# Patient Record
Sex: FEMALE | Race: OTHER | NOT HISPANIC OR LATINO
[De-identification: names, ages, dates, MRNs, and addresses within clinical notes are randomized per-mention and may not be internally consistent; named-entity substitution may affect disease eponyms.]

---

## 2021-04-06 ENCOUNTER — NON-APPOINTMENT (OUTPATIENT)
Age: 71
End: 2021-04-06

## 2021-04-06 ENCOUNTER — APPOINTMENT (OUTPATIENT)
Dept: CARDIOLOGY | Facility: CLINIC | Age: 71
End: 2021-04-06
Payer: MEDICARE

## 2021-04-06 VITALS
WEIGHT: 144.31 LBS | HEART RATE: 81 BPM | BODY MASS INDEX: 26.9 KG/M2 | HEIGHT: 61.5 IN | TEMPERATURE: 98.6 F | OXYGEN SATURATION: 98 %

## 2021-04-06 VITALS — SYSTOLIC BLOOD PRESSURE: 151 MMHG | DIASTOLIC BLOOD PRESSURE: 82 MMHG

## 2021-04-06 PROCEDURE — 99204 OFFICE O/P NEW MOD 45 MIN: CPT

## 2021-04-06 PROCEDURE — 99072 ADDL SUPL MATRL&STAF TM PHE: CPT

## 2021-04-06 PROCEDURE — 93000 ELECTROCARDIOGRAM COMPLETE: CPT

## 2021-04-06 NOTE — REASON FOR VISIT
[Initial Evaluation] : an initial evaluation of [Chest Pain] : chest pain [Hypertension] : hypertension [Palpitations] : palpitations

## 2021-04-06 NOTE — REVIEW OF SYSTEMS
[see HPI] : see HPI [Palpitations] : palpitations [Fever] : no fever [Headache] : no headache [Recent Weight Gain (___ Lbs)] : no recent weight gain [Chills] : no chills [Feeling Fatigued] : not feeling fatigued [Recent Weight Loss (___ Lbs)] : no recent weight loss [Sore Throat] : no sore throat [Shortness Of Breath] : no shortness of breath [Dyspnea on exertion] : not dyspnea during exertion [Chest  Pressure] : no chest pressure [Chest Pain] : no chest pain [Lower Ext Edema] : no extremity edema [Cough] : no cough [Wheezing] : no wheezing [Nausea] : no nausea [Vomiting] : no vomiting [Dizziness] : no dizziness [Confusion] : no confusion was observed [Excessive Thirst] : no polydipsia [Easy Bleeding] : no tendency for easy bleeding [Easy Bruising] : no tendency for easy bruising

## 2021-04-06 NOTE — PHYSICAL EXAM
[General Appearance - Well Developed] : well developed [Normal Appearance] : normal appearance [General Appearance - Well Nourished] : well nourished [Heart Rate And Rhythm] : heart rate and rhythm were normal [Murmurs] : no murmurs present [Edema] : no peripheral edema present [FreeTextEntry1] : radial pulses 2+ b/l [] : no respiratory distress [Respiration, Rhythm And Depth] : normal respiratory rhythm and effort [Auscultation Breath Sounds / Voice Sounds] : lungs were clear to auscultation bilaterally [Bowel Sounds] : normal bowel sounds [Abdomen Soft] : soft [Abdomen Tenderness] : non-tender [Abnormal Walk] : normal gait [Skin Turgor] : normal skin turgor [Oriented To Time, Place, And Person] : oriented to person, place, and time [Impaired Insight] : insight and judgment were intact [Affect] : the affect was normal [Mood] : the mood was normal

## 2021-04-06 NOTE — DISCUSSION/SUMMARY
[FreeTextEntry1] : 70F with HTN and DM presents to establish cardiovascular care\par \par 1. atypical CP/palpitations\par -currently feeling well\par -EKG showing SR without arrhythmia\par -unclear etiology of symptoms, all episodes occurred in setting of hypertensive urgency\par -pt with multiple risk factors for CAD including age, htn hld dm\par -would check tte to eval for wall motion and structural heart disease\par -would check exercise stress test\par \par 2. HTN\par -elevated today, per pt, elevated at home as well\par -now on toprol 50, irbesartan 300 and norvasc 5\par -would increase norvasc to 10\par -cont bp log\par -will check tte for evidence of LVH. \par \par f/u after testing

## 2021-04-06 NOTE — HISTORY OF PRESENT ILLNESS
[FreeTextEntry1] : 70F with HTN and DM presents to establish cardiovascular care\par Sent in by PMD: Dr Noam Menendez [facility] \par \par pt endorsing episodes of palpitations, states she has been to the ER twice at [facility] in the past month for similar symptoms. cxr: clear lungs cta chest: no PE\par \par pt states her palpitations first started 3/24/2021, pt was found to have BP of 200/100 at that time, her son took her to the ER. denies assoc cp and sob. but did endorse a sensation across her chest. pt denies dizziness or syncope. pt denies prev episodes. \par pt was sent home from the ED, and the next week found her bp at home to again be 200/100 nd went back to the ER. \par \par currently pt feels well. pt states she checks her BP at home regularly, is usually 150s systolic prior to taking her meds, and then later in the day its 130s. pt states she then checks it later in the day and its back up to 150s. \par pt denies cp or sob, at rest or on exertion. \par \par \par Exercise: treadmill 10 min a day.\par Diet: none\par Prior cardiac workup: echo many years ago in [place], normal per pt. \par Recent labs: Cr 1.03 gfr 56 d dimer 1.3\par \par EKG: SR 74 bpm\par \par Med hx: HTN, DM, HLD, gout\par OBGYN hx: has 2 sons, denies prev hx of preeclampsia, gest htn, gest dm, or peripartum cardiomyopathy. Currently post menopause\par Sx hx: c/s x2. total hysterectomy, appendix\par Fam hx: no known cardiac hx\par Social hx: originally from [facility]. lives in [place] with son. retired. never tob, etoh, drugs\par Meds: asa 81 lipitor 20 glipizide ibandronate,  irbesartan 300 po qd januvia lantus toprol 50 qd uloric norvac 5\par Allergies: nkda\par

## 2021-04-20 ENCOUNTER — APPOINTMENT (OUTPATIENT)
Dept: CARDIOLOGY | Facility: CLINIC | Age: 71
End: 2021-04-20
Payer: MEDICARE

## 2021-04-20 ENCOUNTER — NON-APPOINTMENT (OUTPATIENT)
Age: 71
End: 2021-04-20

## 2021-04-20 ENCOUNTER — EMERGENCY (EMERGENCY)
Facility: HOSPITAL | Age: 71
LOS: 1 days | Discharge: HOME | End: 2021-04-20
Attending: EMERGENCY MEDICINE | Admitting: EMERGENCY MEDICINE
Payer: MEDICARE

## 2021-04-20 VITALS
RESPIRATION RATE: 16 BRPM | SYSTOLIC BLOOD PRESSURE: 160 MMHG | HEART RATE: 100 BPM | WEIGHT: 145 LBS | DIASTOLIC BLOOD PRESSURE: 84 MMHG | OXYGEN SATURATION: 98 % | BODY MASS INDEX: 27.38 KG/M2 | HEIGHT: 61 IN | TEMPERATURE: 98.7 F

## 2021-04-20 VITALS
DIASTOLIC BLOOD PRESSURE: 92 MMHG | SYSTOLIC BLOOD PRESSURE: 181 MMHG | HEART RATE: 131 BPM | OXYGEN SATURATION: 100 % | TEMPERATURE: 98 F | RESPIRATION RATE: 18 BRPM

## 2021-04-20 DIAGNOSIS — R51.9 HEADACHE, UNSPECIFIED: ICD-10-CM

## 2021-04-20 DIAGNOSIS — R00.2 PALPITATIONS: ICD-10-CM

## 2021-04-20 DIAGNOSIS — R07.89 OTHER CHEST PAIN: ICD-10-CM

## 2021-04-20 LAB
ALBUMIN SERPL ELPH-MCNC: 4.6 G/DL — SIGNIFICANT CHANGE UP (ref 3.3–5)
ALP SERPL-CCNC: 130 U/L — HIGH (ref 40–120)
ALT FLD-CCNC: 25 U/L — SIGNIFICANT CHANGE UP (ref 4–33)
ANION GAP SERPL CALC-SCNC: 9 MMOL/L — SIGNIFICANT CHANGE UP (ref 7–14)
AST SERPL-CCNC: 35 U/L — HIGH (ref 4–32)
BASOPHILS # BLD AUTO: 0.05 K/UL — SIGNIFICANT CHANGE UP (ref 0–0.2)
BASOPHILS NFR BLD AUTO: 0.5 % — SIGNIFICANT CHANGE UP (ref 0–2)
BILIRUB SERPL-MCNC: 0.6 MG/DL — SIGNIFICANT CHANGE UP (ref 0.2–1.2)
BUN SERPL-MCNC: 21 MG/DL — SIGNIFICANT CHANGE UP (ref 7–23)
CALCIUM SERPL-MCNC: 9.6 MG/DL — SIGNIFICANT CHANGE UP (ref 8.4–10.5)
CHLORIDE SERPL-SCNC: 100 MMOL/L — SIGNIFICANT CHANGE UP (ref 98–107)
CO2 SERPL-SCNC: 22 MMOL/L — SIGNIFICANT CHANGE UP (ref 22–31)
CREAT SERPL-MCNC: 1.17 MG/DL — SIGNIFICANT CHANGE UP (ref 0.5–1.3)
EOSINOPHIL # BLD AUTO: 0.16 K/UL — SIGNIFICANT CHANGE UP (ref 0–0.5)
EOSINOPHIL NFR BLD AUTO: 1.5 % — SIGNIFICANT CHANGE UP (ref 0–6)
GLUCOSE SERPL-MCNC: 183 MG/DL — HIGH (ref 70–99)
HCT VFR BLD CALC: 35 % — SIGNIFICANT CHANGE UP (ref 34.5–45)
HGB BLD-MCNC: 11.8 G/DL — SIGNIFICANT CHANGE UP (ref 11.5–15.5)
IANC: 6.53 K/UL — SIGNIFICANT CHANGE UP (ref 1.5–8.5)
IMM GRANULOCYTES NFR BLD AUTO: 0.3 % — SIGNIFICANT CHANGE UP (ref 0–1.5)
LYMPHOCYTES # BLD AUTO: 2.89 K/UL — SIGNIFICANT CHANGE UP (ref 1–3.3)
LYMPHOCYTES # BLD AUTO: 27.6 % — SIGNIFICANT CHANGE UP (ref 13–44)
MAGNESIUM SERPL-MCNC: 2.2 MG/DL — SIGNIFICANT CHANGE UP (ref 1.6–2.6)
MCHC RBC-ENTMCNC: 32.1 PG — SIGNIFICANT CHANGE UP (ref 27–34)
MCHC RBC-ENTMCNC: 33.7 GM/DL — SIGNIFICANT CHANGE UP (ref 32–36)
MCV RBC AUTO: 95.1 FL — SIGNIFICANT CHANGE UP (ref 80–100)
MONOCYTES # BLD AUTO: 0.83 K/UL — SIGNIFICANT CHANGE UP (ref 0–0.9)
MONOCYTES NFR BLD AUTO: 7.9 % — SIGNIFICANT CHANGE UP (ref 2–14)
NEUTROPHILS # BLD AUTO: 6.53 K/UL — SIGNIFICANT CHANGE UP (ref 1.8–7.4)
NEUTROPHILS NFR BLD AUTO: 62.2 % — SIGNIFICANT CHANGE UP (ref 43–77)
NRBC # BLD: 0 /100 WBCS — SIGNIFICANT CHANGE UP
NRBC # FLD: 0 K/UL — SIGNIFICANT CHANGE UP
PHOSPHATE SERPL-MCNC: 2.7 MG/DL — SIGNIFICANT CHANGE UP (ref 2.5–4.5)
PLATELET # BLD AUTO: 288 K/UL — SIGNIFICANT CHANGE UP (ref 150–400)
POTASSIUM SERPL-MCNC: 4.4 MMOL/L — SIGNIFICANT CHANGE UP (ref 3.5–5.3)
POTASSIUM SERPL-SCNC: 4.4 MMOL/L — SIGNIFICANT CHANGE UP (ref 3.5–5.3)
PROT SERPL-MCNC: 8.3 G/DL — SIGNIFICANT CHANGE UP (ref 6–8.3)
RBC # BLD: 3.68 M/UL — LOW (ref 3.8–5.2)
RBC # FLD: 11.6 % — SIGNIFICANT CHANGE UP (ref 10.3–14.5)
SODIUM SERPL-SCNC: 131 MMOL/L — LOW (ref 135–145)
TROPONIN T, HIGH SENSITIVITY RESULT: 12 NG/L — SIGNIFICANT CHANGE UP
TSH SERPL-MCNC: 1.21 UIU/ML — SIGNIFICANT CHANGE UP (ref 0.27–4.2)
WBC # BLD: 10.49 K/UL — SIGNIFICANT CHANGE UP (ref 3.8–10.5)
WBC # FLD AUTO: 10.49 K/UL — SIGNIFICANT CHANGE UP (ref 3.8–10.5)

## 2021-04-20 PROCEDURE — 93306 TTE W/DOPPLER COMPLETE: CPT

## 2021-04-20 PROCEDURE — 93880 EXTRACRANIAL BILAT STUDY: CPT

## 2021-04-20 PROCEDURE — 99072 ADDL SUPL MATRL&STAF TM PHE: CPT

## 2021-04-20 PROCEDURE — 93242 EXT ECG>48HR<7D RECORDING: CPT

## 2021-04-20 PROCEDURE — 99215 OFFICE O/P EST HI 40 MIN: CPT

## 2021-04-20 PROCEDURE — 93010 ELECTROCARDIOGRAM REPORT: CPT

## 2021-04-20 PROCEDURE — 99285 EMERGENCY DEPT VISIT HI MDM: CPT | Mod: 25

## 2021-04-20 RX ORDER — SODIUM CHLORIDE 9 MG/ML
1000 INJECTION INTRAMUSCULAR; INTRAVENOUS; SUBCUTANEOUS ONCE
Refills: 0 | Status: COMPLETED | OUTPATIENT
Start: 2021-04-20 | End: 2021-04-20

## 2021-04-20 RX ADMIN — SODIUM CHLORIDE 1000 MILLILITER(S): 9 INJECTION INTRAMUSCULAR; INTRAVENOUS; SUBCUTANEOUS at 22:42

## 2021-04-20 NOTE — ED PROVIDER NOTE - OBJECTIVE STATEMENT
71yo F hx htn hld dm recent episodes of intermittent palpitations w/ flush sensation in fast and chills that resolve x 1 hour. Was seen in [facility] ED x3 for sx and dc'ed. Seen by cards Dr. Zepeda and had reportedly negative echo done and put on halter monitor and sent to ED. Pt denies any sx at this time. No cp/sob, exacerbating/relieving factors, abd pain, n/v/d, recent weight changes or temperature intolerance.

## 2021-04-20 NOTE — ED PROVIDER NOTE - [facility] ED ROS FT
CONSTITUTIONAL: No fevers, no lightheadedness, no dizziness  EYES: no visual changes, no eye pain  EARS: no ear drainage, no ear pain, no change in hearing  NOSE: no nasal congestion  MOUTH/THROAT: no sore throat  CV: No chest pain  RESP: No SOB, no cough  GI: No n/v/d, no abd pain  : no dysuria, no hematuria, no flank pain  MSK: no back pain, no extremity pain  SKIN: no rashes  NEURO: no headache, no focal weakness, no decreased sensation/paresthesias

## 2021-04-20 NOTE — ED PROVIDER NOTE - CLINICAL SUMMARY MEDICAL DECISION MAKING FREE TEXT BOX
palpitations - pt has had symptoms since march. No syncope. No chest pain. EKG with sinus tach. No sob. Plan for labs, IVF, observe in ED. Patient likely to be discharged.

## 2021-04-20 NOTE — ED PROVIDER NOTE - PHYSICAL EXAMINATION
Gen: Well appearing, NAD  Head: NCAT  HEENT: PERRL, MMM, normal conjunctiva, anicteric, neck supple  Lung: CTAB, no adventitious sounds  CV: RRR, no murmurs  Abd: soft, NTND, no rebound or guarding, no CVAT  MSK: No edema, no visible deformities  Neuro: Moving all extremity grossly  Skin: Warm and dry, no evidence of rash

## 2021-04-20 NOTE — ED PROVIDER NOTE - NSFOLLOWUPINSTRUCTIONS_ED_ALL_ED_FT
No signs of emergency medical condition on today's workup.  Presumptive diagnosis made, but further evaluation may be required by your primary care doctor or specialist for a definitive diagnosis.  Therefore, follow up as directed and if symptoms change/worsen or any emergency conditions, please return to the ER.     Follow up with your primary doctor as discussed

## 2021-04-20 NOTE — ED PROVIDER NOTE - PATIENT PORTAL LINK FT
You can access the FollowMyHealth Patient Portal offered by [facility] by registering at the following website: [email or web address]. By joining [employer]’s FollowMyHealth portal, you will also be able to view your health information using other applications (apps) compatible with our system.

## 2021-04-20 NOTE — ED PROVIDER NOTE - ATTENDING CONTRIBUTION TO CARE
Patient is a 71 yo F with history of DM, HTN, hyperlipidemia here for evaluation of intermittent palpitations. Patient reports feeling flushed at times, followed by chills. She states she went to [place] ED for the same symptoms and was evaluated in the ED and discharged. She saw Dr. Zepeda of cardiology, reports had an echo and Holter monitor. No chest pain or shortness of breath. No fevers, chills, nausea, vomiting or diarrhea.    VS noted  Gen. no acute distress, Non toxic   HEENT: EOMI, mmm,   Lungs: CTAB/L no C/ W /R   Chest wall: holter monitor in place  CVS: tachycardia  Abd; Soft non tender, non distended   Ext: no edema  Skin: no rash  Neuro AAOx3 non focal clear speech  a/p: palpitations - pt has had symptoms since march. No syncope. No chest pain. EKG with sinus tach. No sob. Plan for labs, IVF, observe in ED. Patient likely to be discharged.   - [place] RODRÍGUEZ

## 2021-04-20 NOTE — ED PROVIDER NOTE - PROGRESS NOTE DETAILS
Zeeshan Martin DO PGY3 - pt feeling better, vitals improved. Young unremarkable. Tried to reach pmd but no callback. Feels comfortable with plan of dc home and fu

## 2021-04-20 NOTE — ED ADULT NURSE NOTE - OBJECTIVE STATEMENT
Patient received in room #6 c/o palpitations Patient received in room #6 c/o palpitations since march. Patient A&OX3, ambulatory. received with halter monitor to left chest wall. Patient denies any chest pain. Denies any fevers or shortness of breath. 20G IV Placed in left ac, labs drawn and sent. Sinus tach on CM.

## 2021-04-20 NOTE — ED ADULT TRIAGE NOTE - CHIEF COMPLAINT QUOTE
Pt coming for palpitations for the past month, pt has been evaluated in other hospitals for this same complaint. Pt endorses headache. Pt denies any present chest pain, no shortness of breath. Pt has cardiac monitor to Lt chest wall placed today by cardiologist.

## 2021-04-21 VITALS
DIASTOLIC BLOOD PRESSURE: 64 MMHG | RESPIRATION RATE: 16 BRPM | SYSTOLIC BLOOD PRESSURE: 159 MMHG | HEART RATE: 99 BPM | TEMPERATURE: 98 F | OXYGEN SATURATION: 100 %

## 2021-04-21 LAB — TROPONIN T, HIGH SENSITIVITY RESULT: 11 NG/L — SIGNIFICANT CHANGE UP

## 2021-04-21 RX ADMIN — SODIUM CHLORIDE 1000 MILLILITER(S): 9 INJECTION INTRAMUSCULAR; INTRAVENOUS; SUBCUTANEOUS at 00:18

## 2021-04-27 ENCOUNTER — APPOINTMENT (OUTPATIENT)
Dept: CARDIOLOGY | Facility: CLINIC | Age: 71
End: 2021-04-27
Payer: MEDICAID

## 2021-04-27 VITALS — DIASTOLIC BLOOD PRESSURE: 64 MMHG | HEART RATE: 102 BPM | SYSTOLIC BLOOD PRESSURE: 138 MMHG

## 2021-04-27 PROCEDURE — 99213 OFFICE O/P EST LOW 20 MIN: CPT | Mod: 25

## 2021-04-27 PROCEDURE — 93015 CV STRESS TEST SUPVJ I&R: CPT

## 2021-04-27 PROCEDURE — 99072 ADDL SUPL MATRL&STAF TM PHE: CPT

## 2021-04-27 RX ORDER — AMLODIPINE BESYLATE 10 MG/1
10 TABLET ORAL DAILY
Qty: 90 | Refills: 1 | Status: ACTIVE | COMMUNITY
Start: 2021-04-06 | End: 1900-01-01

## 2021-04-27 RX ORDER — CARVEDILOL 12.5 MG/1
12.5 TABLET, FILM COATED ORAL TWICE DAILY
Qty: 60 | Refills: 5 | Status: ACTIVE | COMMUNITY
Start: 2021-04-15 | End: 1900-01-01

## 2021-04-27 NOTE — DISCUSSION/SUMMARY
[FreeTextEntry1] : 70F with HTN and DM presents for f/u\par \par 1. atypical CP/palpitations\par -currently c/o HA\par -unclear etiology of symptoms, all episodes occurred in setting of hypertensive urgency, SBP now 160. pt has not had any cardiac testing so far. \par -would check tte to eval for wall motion and structural heart disease, and will check carotid given HA. \par -would check exercise stress test, pt scheduled for next week. \par -will do 1 week extended holter. \par \par 2. HTN\par -elevated today, per pt, elevated at home as well\par -now on coreg 6.25, irbesartan 300 and norvasc 5\par -cont bp log\par -will check tte for evidence of LVH. \par \par f/u after testing. \par \par \par pt has been to [facility] ER 3 times in the past few weeks, cont to get d/c from ER. d/w pt that if she cont to feel worse, she should go to  the ER at [facility] or [facility] for further eval. \par

## 2021-04-27 NOTE — PHYSICAL EXAM
[General Appearance - Well Developed] : well developed [General Appearance - Well Nourished] : well nourished [] : no respiratory distress [Respiration, Rhythm And Depth] : normal respiratory rhythm and effort [Exaggerated Use Of Accessory Muscles For Inspiration] : no accessory muscle use [Auscultation Breath Sounds / Voice Sounds] : lungs were clear to auscultation bilaterally [Heart Rate And Rhythm] : heart rate and rhythm were normal [Murmurs] : no murmurs present [Heart Sounds] : normal S1 and S2 [Bowel Sounds] : normal bowel sounds [Abdomen Soft] : soft [Abdomen Tenderness] : non-tender [Skin Turgor] : normal skin turgor [Abnormal Walk] : normal gait [Oriented To Time, Place, And Person] : oriented to person, place, and time [Impaired Insight] : insight and judgment were intact [Affect] : the affect was normal [Mood] : the mood was normal [FreeTextEntry1] : jvd not appreciated

## 2021-04-27 NOTE — REVIEW OF SYSTEMS
[Headache] : headache [Feeling Fatigued] : feeling fatigued [Eye Pain] : eye pain [Sinus Pressure] : sinus pressure [see HPI] : see HPI [Palpitations] : palpitations [Fever] : no fever [Recent Weight Gain (___ Lbs)] : no recent weight gain [Chills] : no chills [Recent Weight Loss (___ Lbs)] : no recent weight loss [Blurry Vision] : no blurred vision [Sore Throat] : no sore throat [Shortness Of Breath] : no shortness of breath [Dyspnea on exertion] : not dyspnea during exertion [Chest  Pressure] : no chest pressure [Chest Pain] : no chest pain [Lower Ext Edema] : no extremity edema [Leg Claudication] : no intermittent leg claudication [Cough] : no cough [Wheezing] : no wheezing [Nausea] : no nausea [Vomiting] : no vomiting [Dizziness] : no dizziness [Confusion] : no confusion was observed [Excessive Thirst] : no polydipsia [Easy Bleeding] : no tendency for easy bleeding [Easy Bruising] : no tendency for easy bruising

## 2021-04-27 NOTE — HISTORY OF PRESENT ILLNESS
[FreeTextEntry1] : 70F with HTN and DM presents for f/u\par PMD: Dr Noam Menendez [facility] \par \par \par pt last seen for an initial visit on 4/6/21 with palpitations. pt went to ER at [facility] x2 prior to initial cardiac eval with similar symptoms. pt also with episodes of HTN up to 200s systolic. \par at OSH:  cxr: clear lungs cta chest: no PE\par \par at last visit, pt was on toprol 50, irbesartan 300 and norvasc was increased from 5 to 10. pt did not tolerate the increase in norvasc and was changed back to 5. toprol was changed to coreg 6.25.\par \par pt went to the ER again at [facility] for palpitations this past weekend, again with HTN and palpitations, and HA.  pt was sent home from the ED.\par \par pt now presents for f/u\par \par currently pt without cp, sob, palpitations. does endorse HA, and a feeling of a warm sensation in her eyes and ears. pt states these exact symptoms have been ongoing over the past few weeks.\par \par \par \par \par Exercise: treadmill 10 min a day.\par Diet: none\par Prior cardiac workup: echo many years ago in [place], normal per pt. \par Recent labs: Cr 1.03 gfr 56 d dimer 1.3\par \par \par Med hx: HTN, DM, HLD, gout\par OBGYN hx: has 2 sons, denies prev hx of preeclampsia, gest htn, gest dm, or peripartum cardiomyopathy. Currently post menopause\par Sx hx: c/s x2. total hysterectomy, appendix\par Fam hx: no known cardiac hx\par Social hx: originally from [facility]. lives in [place] with son. retired. never tob, etoh, drugs\par Meds: asa 81 lipitor 20 glipizide ibandronate, irbesartan 300 po qd januvia lantus coreg 6.25qd uloric norvac 5\par Allergies: nkda\par

## 2021-04-28 ENCOUNTER — APPOINTMENT (OUTPATIENT)
Dept: CARDIOLOGY | Facility: CLINIC | Age: 71
End: 2021-04-28

## 2021-05-21 ENCOUNTER — NON-APPOINTMENT (OUTPATIENT)
Age: 71
End: 2021-05-21

## 2021-05-21 PROCEDURE — 93244 EXT ECG>48HR<7D REV&INTERPJ: CPT

## 2021-05-21 PROCEDURE — 99072 ADDL SUPL MATRL&STAF TM PHE: CPT

## 2021-06-15 ENCOUNTER — APPOINTMENT (OUTPATIENT)
Dept: OTOLARYNGOLOGY | Facility: CLINIC | Age: 71
End: 2021-06-15
Payer: MEDICARE

## 2021-06-15 VITALS
HEIGHT: 61 IN | DIASTOLIC BLOOD PRESSURE: 61 MMHG | BODY MASS INDEX: 27.17 KG/M2 | HEART RATE: 91 BPM | RESPIRATION RATE: 18 BRPM | TEMPERATURE: 98 F | WEIGHT: 143.9 LBS | SYSTOLIC BLOOD PRESSURE: 145 MMHG

## 2021-06-15 DIAGNOSIS — H92.03 OTALGIA, BILATERAL: ICD-10-CM

## 2021-06-15 DIAGNOSIS — Z78.9 OTHER SPECIFIED HEALTH STATUS: ICD-10-CM

## 2021-06-15 PROCEDURE — 99203 OFFICE O/P NEW LOW 30 MIN: CPT | Mod: 25

## 2021-06-15 PROCEDURE — 92557 COMPREHENSIVE HEARING TEST: CPT

## 2021-06-15 PROCEDURE — 92567 TYMPANOMETRY: CPT

## 2021-06-15 PROCEDURE — 99072 ADDL SUPL MATRL&STAF TM PHE: CPT

## 2021-06-15 NOTE — HISTORY OF PRESENT ILLNESS
[de-identified] : 70 year old female presents with bilateral ear discomfort initially presented this  year when pt had episode of uncontrolled hypertension. Pt states ears are hot to touch when her BP is elevated.  She denies ringing , hearing loss , otalgia or otorrhea. pt states symptoms resolved spontaneously after hypertension was under control.

## 2021-06-15 NOTE — DATA REVIEWED
[de-identified] : Hearing is WNL from 250Hz-8kHz Au\par Type As tymps Au suggesting restricted TM mobility.

## 2021-06-15 NOTE — REASON FOR VISIT
[Initial Evaluation] : an initial evaluation for [Other: _____] : [unfilled] [FreeTextEntry2] : bilateral ear discomfort

## 2021-06-15 NOTE — CONSULT LETTER
[Dear  ___] : Dear  [unfilled], [Consult Letter:] : I had the pleasure of evaluating your patient, [unfilled]. [Please see my note below.] : Please see my note below. [Consult Closing:] : Thank you very much for allowing me to participate in the care of this patient.  If you have any questions, please do not hesitate to contact me. [Sincerely,] : Sincerely, [FreeTextEntry2] : Liliane Menendez MD [FreeTextEntry3] : Clifford Garcia MD, FACS\par Chief of Otolaryngology [facility]\par  - Dept. of Otolaryngology\par [employer]\par \par

## 2021-06-15 NOTE — ASSESSMENT
[FreeTextEntry1] : Pt's sensation of warmth of her ears appears to be mainly due to her HTN issue.  She was encouraged to work with her PMD to maintain her BP at an appropriate level.  She will f/u with me prn.

## 2022-04-19 ENCOUNTER — APPOINTMENT (OUTPATIENT)
Dept: ORTHOPEDIC SURGERY | Facility: CLINIC | Age: 72
End: 2022-04-19

## 2023-09-20 ENCOUNTER — APPOINTMENT (OUTPATIENT)
Dept: ORTHOPEDIC SURGERY | Facility: CLINIC | Age: 73
End: 2023-09-20
Payer: MEDICARE

## 2023-09-20 VITALS — WEIGHT: 143 LBS | HEIGHT: 61 IN | BODY MASS INDEX: 27 KG/M2

## 2023-09-20 DIAGNOSIS — S39.012A STRAIN OF MUSCLE, FASCIA AND TENDON OF LOWER BACK, INITIAL ENCOUNTER: ICD-10-CM

## 2023-09-20 DIAGNOSIS — Z00.00 ENCOUNTER FOR GENERAL ADULT MEDICAL EXAMINATION W/OUT ABNORMAL FINDINGS: ICD-10-CM

## 2023-09-20 DIAGNOSIS — E11.9 TYPE 2 DIABETES MELLITUS W/OUT COMPLICATIONS: ICD-10-CM

## 2023-09-20 DIAGNOSIS — E78.00 PURE HYPERCHOLESTEROLEMIA, UNSPECIFIED: ICD-10-CM

## 2023-09-20 PROCEDURE — 99203 OFFICE O/P NEW LOW 30 MIN: CPT

## 2023-09-20 PROCEDURE — 72100 X-RAY EXAM L-S SPINE 2/3 VWS: CPT

## 2023-09-20 PROCEDURE — 72170 X-RAY EXAM OF PELVIS: CPT

## 2023-09-20 RX ORDER — IBUPROFEN 800 MG/1
800 TABLET, FILM COATED ORAL 3 TIMES DAILY
Qty: 90 | Refills: 1 | Status: ACTIVE | COMMUNITY
Start: 2023-09-20 | End: 1900-01-01

## 2023-10-04 ENCOUNTER — APPOINTMENT (OUTPATIENT)
Dept: ORTHOPEDIC SURGERY | Facility: CLINIC | Age: 73
End: 2023-10-04
Payer: MEDICARE

## 2023-10-04 PROCEDURE — 99214 OFFICE O/P EST MOD 30 MIN: CPT

## 2023-10-07 ENCOUNTER — APPOINTMENT (OUTPATIENT)
Dept: MRI IMAGING | Facility: CLINIC | Age: 73
End: 2023-10-07
Payer: MEDICARE

## 2023-10-07 PROCEDURE — 72148 MRI LUMBAR SPINE W/O DYE: CPT

## 2023-11-06 ENCOUNTER — APPOINTMENT (OUTPATIENT)
Dept: ORTHOPEDIC SURGERY | Facility: CLINIC | Age: 73
End: 2023-11-06
Payer: MEDICARE

## 2023-11-06 VITALS — HEIGHT: 61 IN | WEIGHT: 143 LBS | BODY MASS INDEX: 27 KG/M2

## 2023-11-06 DIAGNOSIS — M43.16 SPONDYLOLISTHESIS, LUMBAR REGION: ICD-10-CM

## 2023-11-06 DIAGNOSIS — M48.062 SPINAL STENOSIS, LUMBAR REGION WITH NEUROGENIC CLAUDICATION: ICD-10-CM

## 2023-11-06 PROCEDURE — 99214 OFFICE O/P EST MOD 30 MIN: CPT

## 2023-12-14 ENCOUNTER — NON-APPOINTMENT (OUTPATIENT)
Age: 73
End: 2023-12-14

## 2023-12-14 ENCOUNTER — APPOINTMENT (OUTPATIENT)
Dept: CARDIOLOGY | Facility: CLINIC | Age: 73
End: 2023-12-14
Payer: MEDICARE

## 2023-12-14 VITALS
BODY MASS INDEX: 25.68 KG/M2 | DIASTOLIC BLOOD PRESSURE: 70 MMHG | OXYGEN SATURATION: 99 % | HEIGHT: 61 IN | SYSTOLIC BLOOD PRESSURE: 144 MMHG | WEIGHT: 136 LBS | HEART RATE: 82 BPM | TEMPERATURE: 98.2 F

## 2023-12-14 DIAGNOSIS — I10 ESSENTIAL (PRIMARY) HYPERTENSION: ICD-10-CM

## 2023-12-14 DIAGNOSIS — I65.23 OCCLUSION AND STENOSIS OF BILATERAL CAROTID ARTERIES: ICD-10-CM

## 2023-12-14 DIAGNOSIS — E11.9 TYPE 2 DIABETES MELLITUS W/OUT COMPLICATIONS: ICD-10-CM

## 2023-12-14 PROCEDURE — 93000 ELECTROCARDIOGRAM COMPLETE: CPT

## 2023-12-14 PROCEDURE — 99215 OFFICE O/P EST HI 40 MIN: CPT | Mod: 25

## 2023-12-14 NOTE — DISCUSSION/SUMMARY
[FreeTextEntry1] : 73F with HTN and DM presents for f/u  pt overall feeling well, euvolemic ekg with worsening poor r wave progression than prev pt walks a few times a week will check exercise stress will check tte to eval for LV function will check carotid duplex, as prev study with b/l plaque.   f/u after testing >40 min spent on complete encounter [EKG obtained to assist in diagnosis and management of assessed problem(s)] : EKG obtained to assist in diagnosis and management of assessed problem(s)

## 2023-12-14 NOTE — HISTORY OF PRESENT ILLNESS
[FreeTextEntry1] : 73F with HTN and DM presents for f/u PMD: Dr Noam Menendez [facility]   Previously, pt seen for an initial visit on 4/6/21 with palpitations. pt went to ER at [facility] x2 prior to initial cardiac eval with similar symptoms. pt also with episodes of HTN up to 200s systolic. at OSH: cxr: clear lungs cta chest: no PE last seen 4/2021, feeling well. TTE and exercise stress done at that time both grossly unremarkable. extended holter also grossly normal. carotid duplex showing b/l plaque w stenosis  pt has not been seen since 4/2021. pt now presents for f/u today,  overall feeling well, sent back form PMD for checkup. no cp or sob at rest or on exertion. denies palpitations, dizziness syncope, diaphoresis, edema orthopnea.   Exercise: walking 2 times a week, no issues Diet: none  EKG: poor r wave progression, mildly changed from prev  Prior cardiac workup: see above Recent labs: Cr 1.03 gfr 56 d dimer 1.3  Med hx: HTN, DM, HLD, gout OBGYN hx: has 2 sons, denies prev hx of preeclampsia, gest htn, gest dm, or peripartum cardiomyopathy. Currently post menopause Sx hx: c/s x2. total hysterectomy, appendix, cataracts Fam hx: no known cardiac hx Social hx: originally from [facility]. lives in [place] with son. retired. never tob, etoh, drugs Meds: asa 81 lipitor 20 glipizide ibandronate, irbesartan 300 po qd januvia lantus coreg 6.25qd uloric norvac 5 Allergies: nkda

## 2024-01-06 DIAGNOSIS — M54.16 RADICULOPATHY, LUMBAR REGION: ICD-10-CM

## 2024-02-01 DIAGNOSIS — R94.31 ABNORMAL ELECTROCARDIOGRAM [ECG] [EKG]: ICD-10-CM

## 2024-02-02 ENCOUNTER — APPOINTMENT (OUTPATIENT)
Dept: CARDIOLOGY | Facility: CLINIC | Age: 74
End: 2024-02-02
Payer: MEDICARE

## 2024-02-02 VITALS — SYSTOLIC BLOOD PRESSURE: 144 MMHG | DIASTOLIC BLOOD PRESSURE: 72 MMHG | HEART RATE: 81 BPM

## 2024-02-02 PROCEDURE — 93015 CV STRESS TEST SUPVJ I&R: CPT

## 2024-02-02 PROCEDURE — ZZZZZ: CPT

## 2024-02-02 PROCEDURE — 99213 OFFICE O/P EST LOW 20 MIN: CPT | Mod: 25

## 2024-02-02 PROCEDURE — 93306 TTE W/DOPPLER COMPLETE: CPT

## 2024-02-02 PROCEDURE — 93880 EXTRACRANIAL BILAT STUDY: CPT

## 2024-02-14 ENCOUNTER — OUTPATIENT (OUTPATIENT)
Dept: OUTPATIENT SERVICES | Facility: HOSPITAL | Age: 74
LOS: 1 days | End: 2024-02-14
Payer: MEDICARE

## 2024-02-14 ENCOUNTER — APPOINTMENT (OUTPATIENT)
Dept: CV DIAGNOSTICS | Facility: HOSPITAL | Age: 74
End: 2024-02-14

## 2024-02-14 ENCOUNTER — RESULT REVIEW (OUTPATIENT)
Age: 74
End: 2024-02-14

## 2024-02-14 DIAGNOSIS — R94.31 ABNORMAL ELECTROCARDIOGRAM [ECG] [EKG]: ICD-10-CM

## 2024-02-14 PROCEDURE — A9500: CPT

## 2024-02-14 PROCEDURE — 93017 CV STRESS TEST TRACING ONLY: CPT

## 2024-02-14 PROCEDURE — 93016 CV STRESS TEST SUPVJ ONLY: CPT | Mod: MH

## 2024-02-14 PROCEDURE — 78452 HT MUSCLE IMAGE SPECT MULT: CPT | Mod: 26,MH

## 2024-02-14 PROCEDURE — 93018 CV STRESS TEST I&R ONLY: CPT | Mod: MH

## 2024-02-14 PROCEDURE — 78452 HT MUSCLE IMAGE SPECT MULT: CPT | Mod: MH

## 2024-07-01 ENCOUNTER — APPOINTMENT (OUTPATIENT)
Dept: ORTHOPEDIC SURGERY | Facility: CLINIC | Age: 74
End: 2024-07-01
Payer: MEDICARE

## 2024-07-01 VITALS — HEIGHT: 61 IN | WEIGHT: 136 LBS | BODY MASS INDEX: 25.68 KG/M2

## 2024-07-01 PROBLEM — M47.816 LUMBAR SPONDYLOSIS: Status: ACTIVE | Noted: 2023-10-04

## 2024-07-01 PROCEDURE — 99214 OFFICE O/P EST MOD 30 MIN: CPT

## 2024-07-11 ENCOUNTER — APPOINTMENT (OUTPATIENT)
Dept: ORTHOPEDIC SURGERY | Facility: CLINIC | Age: 74
End: 2024-07-11

## 2024-07-11 VITALS — HEIGHT: 61 IN | WEIGHT: 136 LBS | BODY MASS INDEX: 25.68 KG/M2

## 2024-07-11 DIAGNOSIS — M65.351 TRIGGER FINGER, RIGHT LITTLE FINGER: ICD-10-CM

## 2024-07-11 DIAGNOSIS — M65.352 TRIGGER FINGER, LEFT LITTLE FINGER: ICD-10-CM

## 2024-07-11 DIAGNOSIS — M65.321 TRIGGER FINGER, RIGHT INDEX FINGER: ICD-10-CM

## 2024-07-11 DIAGNOSIS — M65.341 TRIGGER FINGER, RIGHT RING FINGER: ICD-10-CM

## 2024-07-11 PROCEDURE — J3490M: CUSTOM | Mod: JZ

## 2024-07-11 PROCEDURE — 99214 OFFICE O/P EST MOD 30 MIN: CPT | Mod: 25

## 2024-07-11 PROCEDURE — 20550 NJX 1 TENDON SHEATH/LIGAMENT: CPT | Mod: 50

## 2024-07-16 ENCOUNTER — APPOINTMENT (OUTPATIENT)
Dept: PAIN MANAGEMENT | Facility: CLINIC | Age: 74
End: 2024-07-16

## 2024-07-16 VITALS — BODY MASS INDEX: 26.24 KG/M2 | WEIGHT: 139 LBS | HEIGHT: 61 IN

## 2024-07-16 DIAGNOSIS — M43.16 SPONDYLOLISTHESIS, LUMBAR REGION: ICD-10-CM

## 2024-07-16 DIAGNOSIS — Z87.448 PERSONAL HISTORY OF OTHER DISEASES OF URINARY SYSTEM: ICD-10-CM

## 2024-07-16 DIAGNOSIS — Z86.79 PERSONAL HISTORY OF OTHER DISEASES OF THE CIRCULATORY SYSTEM: ICD-10-CM

## 2024-07-16 DIAGNOSIS — Z80.9 FAMILY HISTORY OF MALIGNANT NEOPLASM, UNSPECIFIED: ICD-10-CM

## 2024-07-16 DIAGNOSIS — M54.16 RADICULOPATHY, LUMBAR REGION: ICD-10-CM

## 2024-07-16 DIAGNOSIS — M47.816 SPONDYLOSIS W/OUT MYELOPATHY OR RADICULOPATHY, LUMBAR REGION: ICD-10-CM

## 2024-07-16 DIAGNOSIS — M1A.0490 IDIOPATHIC CHRONIC GOUT, UNSPECIFIED HAND, W/OUT TOPHUS (TOPHI): ICD-10-CM

## 2024-07-16 DIAGNOSIS — M48.062 SPINAL STENOSIS, LUMBAR REGION WITH NEUROGENIC CLAUDICATION: ICD-10-CM

## 2024-07-16 PROCEDURE — 99204 OFFICE O/P NEW MOD 45 MIN: CPT

## 2024-08-07 ENCOUNTER — APPOINTMENT (OUTPATIENT)
Dept: PAIN MANAGEMENT | Facility: CLINIC | Age: 74
End: 2024-08-07

## 2024-08-07 PROCEDURE — 64484 NJX AA&/STRD TFRM EPI L/S EA: CPT | Mod: RT

## 2024-08-07 PROCEDURE — 64483 NJX AA&/STRD TFRM EPI L/S 1: CPT | Mod: RT

## 2024-08-07 NOTE — PROCEDURE
[FreeTextEntry1] : RIGHT L4-L5, L5-S1 transforaminal epidural steroid injection [FreeTextEntry2] : chronic lumbar radiculopathy [FreeTextEntry3] : Procedure Date: 08/07/2024   Preoperative Diagnosis: chronic lumbar radiculopathy   Procedure: RIGHT L4-L5, L5-S1 transforaminal epidural steroid injection under fluoroscopic guidance  Anesthesia: MAC  Complications: none   EBL: none   Procedure in detail:   Patient was seen and examined. Risks, benefits, and alternatives for the procedure were discussed with the patient in detail. The patient expressed understanding, gave written and verbal consent, and placed themselves in a prone position on the procedure table. Skin overlying the lumbosacral spine was prepped with chloraprep and draped in the usual sterile fashion. Fluoroscopic images were obtained to identify the L4 and L5 vertebral bodies. Target was the 6 o'clock position under the RIGHT pedicle at the L4 and L5 vertebral level. Skin overlying the target was marked and infiltrated with 1% lidocaine. Using two 22 gauge, 5 inch spinal needles, these were inserted and advanced under intermittent fluoroscopic guidance. When felt to be engaged in the epidural space, lateral view was used to confirm depth. After negative aspiration for heme/csf, contrast was injected under live fluoroscopy, which showed contrast spread consistent with epidural flow. No evidence of intravascular or intrathecal uptake. At this point, a total of 2ml of injectate, which consisted of 1ml of 6mg/ml betamethasone and 1ml of normal saline were injected through each needle. The needles were restyletted and withdrawn, a band aid was placed over the injection site. Patient tolerated the procedure well. The patient recovered uneventfully and was discharged home in stable condition.

## 2024-08-30 ENCOUNTER — APPOINTMENT (OUTPATIENT)
Dept: PAIN MANAGEMENT | Facility: CLINIC | Age: 74
End: 2024-08-30

## 2024-08-30 VITALS — BODY MASS INDEX: 26.43 KG/M2 | HEIGHT: 61 IN | WEIGHT: 140 LBS

## 2024-08-30 DIAGNOSIS — M54.16 RADICULOPATHY, LUMBAR REGION: ICD-10-CM

## 2024-08-30 DIAGNOSIS — M47.816 SPONDYLOSIS W/OUT MYELOPATHY OR RADICULOPATHY, LUMBAR REGION: ICD-10-CM

## 2024-08-30 DIAGNOSIS — M43.16 SPONDYLOLISTHESIS, LUMBAR REGION: ICD-10-CM

## 2024-08-30 DIAGNOSIS — M48.062 SPINAL STENOSIS, LUMBAR REGION WITH NEUROGENIC CLAUDICATION: ICD-10-CM

## 2024-08-30 PROCEDURE — 99214 OFFICE O/P EST MOD 30 MIN: CPT

## 2024-08-30 RX ORDER — CYCLOBENZAPRINE HYDROCHLORIDE 5 MG/1
5 TABLET, FILM COATED ORAL TWICE DAILY
Qty: 60 | Refills: 1 | Status: ACTIVE | COMMUNITY
Start: 2024-08-30 | End: 1900-01-01

## 2024-08-30 NOTE — PHYSICAL EXAM
[de-identified] : Gen: NAD Head: NC/AT Eyes: no glasses, no scleral icterus ENT: mucous membranes moist CV: No JVD Lungs: nonlabored breathing Abd: soft, NT/ND Ext: full ROM in all extremities, no peripheral edema Back: +TTP in the right low lumbar facet region, +SLR on the RIGHT, +facet loading on the right Neuro: CN intact LEs +5 L +5 R hip flexion +5 L +5 R leg extension +5 L +5 R leg flexion +5 L +5 R foot dorsiflexion +5 L +5 R foot plantarflexion +5 L +5 R EHL extension Psych: normal affect Skin: no visible lesions

## 2024-08-30 NOTE — HISTORY OF PRESENT ILLNESS
[Lower back] : lower back [Buttock] : buttock [Right Leg] : right leg [10] : 10 [Dull/Aching] : dull/aching [Radiating] : radiating [Sharp] : sharp [Tightness] : tightness [Intermittent] : intermittent [Household chores] : household chores [Leisure] : leisure [Meds] : meds [Sitting] : sitting [Physical therapy] : physical therapy [Standing] : standing [Walking] : walking [0] : 0 [FreeTextEntry1] : 8/30/2024 BRENDON DEL RIO is a 73 year-old woman presenting for a RPV for a history of chronic low back pain. The patient underwent a RIGHT L4-L5, L5-S1 TFESI w/ MAC on 8/7/2024.  8/7/2024 RIGHT L4-L5, L5-S1 TFESI w/ MAC which has helped to alleviate 90% of her pain. She notes resolution of RLE numbness. She still notes occasional aching pain in the low back. She has been doing daily stretching.  The patient states that average pain over the past week was 0/10 in severity. Mood: No depression or anxiety.  Sleep: No difficulty with sleep 2/2 pain.   7/16/2024 BRENDON DEL RIO is a 73 year-old woman presenting for a NPV for a history of chronic low back pain. The patient notes having pain in the right low back with radiation to the right posterolateral thigh and lateral leg. She notes that she had a fall down some stairs in 2016 that may have precipitated her pain. She notes intermittent tingling and numbness. No weakness. Pain is worse with standing and walking. RLE radicular symptoms typically occur after walking for long periods. The patient states that average pain over the past week was 10/10 in severity. Mood: No depression or anxiety.  Sleep: No difficulty with sleep 2/2 pain.  [] : no [FreeTextEntry7] : R leg  [FreeTextEntry9] : pain relief cream [de-identified] : x-ray and MRI

## 2024-08-30 NOTE — DISCUSSION/SUMMARY
[de-identified] : BRENDON DEL RIO is a 73 year-old woman presenting for a RPV for a history of chronic neck/low back pain.  Prior treatment: 8/7/2024 RIGHT L4-L5, L5-S TFESI w/ MAC w/ 90% improvement of pain and function  Physical therapy x6 weeks Ibuprofen 800mg  Acetaminophen prn Patient has participated and failed at least 6 weeks of conservative therapy including physical therapy and a prescribed home exercise program as well as 6 weeks of activity modifications, including heat, ice, rest, and over the counter medications.  Plan: 1) MRI lumbar spine images reviewed with the patient. 2) Consider repeat RIGHT L4-L5, L5-S1 TFESI w/ MAC in the future 3) Continue home exercise regimen 4) AVOID NSAIDs 2/2 CKD 5) Continue acetaminophen prn 6) Trial cyclobenzaprine 5mg BID prn; Discussed AEs, including sedation, dizziness, somnolence. Patient told to use caution when driving after taking the first few doses. 7) RTC prn

## 2024-08-30 NOTE — PHYSICAL EXAM
[de-identified] : Gen: NAD Head: NC/AT Eyes: no glasses, no scleral icterus ENT: mucous membranes moist CV: No JVD Lungs: nonlabored breathing Abd: soft, NT/ND Ext: full ROM in all extremities, no peripheral edema Back: +TTP in the right low lumbar facet region, +SLR on the RIGHT, +facet loading on the right Neuro: CN intact LEs +5 L +5 R hip flexion +5 L +5 R leg extension +5 L +5 R leg flexion +5 L +5 R foot dorsiflexion +5 L +5 R foot plantarflexion +5 L +5 R EHL extension Psych: normal affect Skin: no visible lesions

## 2024-08-30 NOTE — DATA REVIEWED
[MRI] : MRI [Lumbar Spine] : lumbar spine [Report was reviewed and noted in the chart] : The report was reviewed and noted in the chart [I independently reviewed and interpreted images and report] : I independently reviewed and interpreted images and report [FreeTextEntry1] : 10/7/2023 MRI Lumbar Spine O&C L1-L2: left-sided disc herniation  L2-L3: left foraminal disc herniation w/o compression of the left L2 nerve root L3-L4: broad disc bulge, facet hypertrophy, ligamentum flavum hypertrophy and mild central stenosis L4-L5: broad disc bulge, facet hypertrophy, ligamentum flavum hypertrophy w/ significant central stenosis

## 2024-08-30 NOTE — DISCUSSION/SUMMARY
[de-identified] : BRENDON DEL RIO is a 73 year-old woman presenting for a RPV for a history of chronic neck/low back pain.  Prior treatment: 8/7/2024 RIGHT L4-L5, L5-S TFESI w/ MAC w/ 90% improvement of pain and function  Physical therapy x6 weeks Ibuprofen 800mg  Acetaminophen prn Patient has participated and failed at least 6 weeks of conservative therapy including physical therapy and a prescribed home exercise program as well as 6 weeks of activity modifications, including heat, ice, rest, and over the counter medications.  Plan: 1) MRI lumbar spine images reviewed with the patient. 2) Consider repeat RIGHT L4-L5, L5-S1 TFESI w/ MAC in the future 3) Continue home exercise regimen 4) AVOID NSAIDs 2/2 CKD 5) Continue acetaminophen prn 6) Trial cyclobenzaprine 5mg BID prn; Discussed AEs, including sedation, dizziness, somnolence. Patient told to use caution when driving after taking the first few doses. 7) RTC prn

## 2024-08-30 NOTE — HISTORY OF PRESENT ILLNESS
[Lower back] : lower back [Buttock] : buttock [Right Leg] : right leg [10] : 10 [Dull/Aching] : dull/aching [Radiating] : radiating [Sharp] : sharp [Tightness] : tightness [Intermittent] : intermittent [Household chores] : household chores [Leisure] : leisure [Meds] : meds [Sitting] : sitting [Physical therapy] : physical therapy [Standing] : standing [Walking] : walking [0] : 0 [FreeTextEntry1] : 8/30/2024 BRENDON DEL RIO is a 73 year-old woman presenting for a RPV for a history of chronic low back pain. The patient underwent a RIGHT L4-L5, L5-S1 TFESI w/ MAC on 8/7/2024.  8/7/2024 RIGHT L4-L5, L5-S1 TFESI w/ MAC which has helped to alleviate 90% of her pain. She notes resolution of RLE numbness. She still notes occasional aching pain in the low back. She has been doing daily stretching.  The patient states that average pain over the past week was 0/10 in severity. Mood: No depression or anxiety.  Sleep: No difficulty with sleep 2/2 pain.   7/16/2024 BRENDON DEL RIO is a 73 year-old woman presenting for a NPV for a history of chronic low back pain. The patient notes having pain in the right low back with radiation to the right posterolateral thigh and lateral leg. She notes that she had a fall down some stairs in 2016 that may have precipitated her pain. She notes intermittent tingling and numbness. No weakness. Pain is worse with standing and walking. RLE radicular symptoms typically occur after walking for long periods. The patient states that average pain over the past week was 10/10 in severity. Mood: No depression or anxiety.  Sleep: No difficulty with sleep 2/2 pain.  [] : no [FreeTextEntry7] : R leg  [FreeTextEntry9] : pain relief cream [de-identified] : x-ray and MRI